# Patient Record
Sex: FEMALE | URBAN - METROPOLITAN AREA
[De-identification: names, ages, dates, MRNs, and addresses within clinical notes are randomized per-mention and may not be internally consistent; named-entity substitution may affect disease eponyms.]

---

## 2023-01-18 ENCOUNTER — APPOINTMENT (RX ONLY)
Dept: URBAN - METROPOLITAN AREA CLINIC 330 | Facility: CLINIC | Age: 43
Setting detail: DERMATOLOGY
End: 2023-01-18

## 2023-01-18 DIAGNOSIS — K13.0 DISEASES OF LIPS: ICD-10-CM | Status: INADEQUATELY CONTROLLED

## 2023-01-18 DIAGNOSIS — L81.4 OTHER MELANIN HYPERPIGMENTATION: ICD-10-CM | Status: INADEQUATELY CONTROLLED

## 2023-01-18 PROCEDURE — ? COUNSELING

## 2023-01-18 PROCEDURE — ? ADDITIONAL NOTES

## 2023-01-18 PROCEDURE — ? PRESCRIPTION MEDICATION MANAGEMENT

## 2023-01-18 PROCEDURE — 99203 OFFICE O/P NEW LOW 30 MIN: CPT

## 2023-01-18 PROCEDURE — ? PRESCRIPTION

## 2023-01-18 PROCEDURE — ? FULL BODY SKIN EXAM - DECLINED

## 2023-01-18 RX ORDER — NYSTATIN 100000 [USP'U]/G
OINTMENT TOPICAL
Qty: 30 | Refills: 1 | Status: ERX | COMMUNITY
Start: 2023-01-18

## 2023-01-18 RX ORDER — HYDROCORTISONE 25 MG/G
OINTMENT TOPICAL
Qty: 28.35 | Refills: 1 | Status: ERX | COMMUNITY
Start: 2023-01-18

## 2023-01-18 RX ADMIN — NYSTATIN: 100000 OINTMENT TOPICAL at 00:00

## 2023-01-18 RX ADMIN — HYDROCORTISONE: 25 OINTMENT TOPICAL at 00:00

## 2023-01-18 ASSESSMENT — LOCATION ZONE DERM: LOCATION ZONE: LIP

## 2023-01-18 ASSESSMENT — LOCATION DETAILED DESCRIPTION DERM
LOCATION DETAILED: LEFT SUPERIOR VERMILION LIP
LOCATION DETAILED: LEFT INFERIOR VERMILION LIP

## 2023-01-18 ASSESSMENT — LOCATION SIMPLE DESCRIPTION DERM: LOCATION SIMPLE: LEFT LIP

## 2023-01-18 NOTE — PROCEDURE: ADDITIONAL NOTES
Render Risk Assessment In Note?: no
Additional Notes: Discussed discoloration likely due to ongoing inflammation. Pt denies being on any medications that could cause pigment changes. Will observe for resolution with time. Pt to use good sun protection to prevent worsening.
Detail Level: Simple

## 2023-01-18 NOTE — HPI: RASH
How Severe Is Your Rash?: mild
Is This A New Presentation, Or A Follow-Up?: Rash
Additional History: Pt is here for a rash on her lips. She states they are discolored, peeling and flakey. She states she has tried otc products.

## 2023-01-18 NOTE — PROCEDURE: MIPS QUALITY
Detail Level: Detailed
Quality 130: Documentation Of Current Medications In The Medical Record: Current Medications Documented
Quality 110: Preventive Care And Screening: Influenza Immunization: Influenza Immunization previously received during influenza season
Quality 431: Preventive Care And Screening: Unhealthy Alcohol Use - Screening: Patient did not receive brief counseling if identified as an unhealthy alcohol user, reason not given
Quality 226: Preventive Care And Screening: Tobacco Use: Screening And Cessation Intervention: Tobacco Screening not Performed for Medical Reasons

## 2023-01-18 NOTE — PROCEDURE: PRESCRIPTION MEDICATION MANAGEMENT
Initiate Treatment: Nystatin and hydrocortisone mix today and apply to aa bid x2 weeks\\nVaseline prn
Detail Level: Simple
Render In Strict Bullet Format?: No

## 2023-03-03 ENCOUNTER — APPOINTMENT (RX ONLY)
Dept: URBAN - METROPOLITAN AREA CLINIC 330 | Facility: CLINIC | Age: 43
Setting detail: DERMATOLOGY
End: 2023-03-03

## 2023-03-03 DIAGNOSIS — L81.4 OTHER MELANIN HYPERPIGMENTATION: ICD-10-CM | Status: IMPROVED

## 2023-03-03 DIAGNOSIS — K13.0 DISEASES OF LIPS: ICD-10-CM | Status: IMPROVED

## 2023-03-03 PROBLEM — L30.9 DERMATITIS, UNSPECIFIED: Status: ACTIVE | Noted: 2023-03-03

## 2023-03-03 PROCEDURE — ? PRESCRIPTION MEDICATION MANAGEMENT

## 2023-03-03 PROCEDURE — 99213 OFFICE O/P EST LOW 20 MIN: CPT

## 2023-03-03 PROCEDURE — ? COUNSELING

## 2023-03-03 ASSESSMENT — LOCATION SIMPLE DESCRIPTION DERM: LOCATION SIMPLE: LEFT LIP

## 2023-03-03 ASSESSMENT — LOCATION ZONE DERM: LOCATION ZONE: LIP

## 2023-03-03 ASSESSMENT — LOCATION DETAILED DESCRIPTION DERM
LOCATION DETAILED: LEFT INFERIOR VERMILION LIP
LOCATION DETAILED: LEFT SUPERIOR VERMILION LIP

## 2023-03-03 NOTE — PROCEDURE: PRESCRIPTION MEDICATION MANAGEMENT
Continue Regimen: Pt advised she can use as needed:\\n\\nNystatin and hydrocortisone mix today and apply to aa bid x2 weeks\\nVaseline prn
Plan: Pt advised that this condition may come and go, it is safe to use the prescription medications as needed \\nPt advised she can re introduce cosmetics one at a time
Initiate Treatment: Dr. Luis Miguel taylor as needed
Detail Level: Simple
Render In Strict Bullet Format?: No
Samples Given: Dr. Dan’s Cortibalm

## 2023-08-02 ENCOUNTER — HOSPITAL ENCOUNTER (OUTPATIENT)
Dept: MAMMOGRAPHY | Age: 43
Discharge: HOME | End: 2023-08-05
Payer: COMMERCIAL

## 2023-08-02 VITALS — BODY MASS INDEX: 26.16 KG/M2 | HEIGHT: 65 IN | WEIGHT: 157 LBS

## 2023-08-02 DIAGNOSIS — Z12.31 BREAST CANCER SCREENING BY MAMMOGRAM: ICD-10-CM

## 2023-08-02 PROCEDURE — 77063 BREAST TOMOSYNTHESIS BI: CPT

## 2023-08-10 ENCOUNTER — HOSPITAL ENCOUNTER (OUTPATIENT)
Dept: MAMMOGRAPHY | Age: 43
End: 2023-08-10
Payer: COMMERCIAL

## 2023-08-10 ENCOUNTER — HOSPITAL ENCOUNTER (OUTPATIENT)
Dept: MAMMOGRAPHY | Age: 43
Discharge: HOME | End: 2023-08-10
Payer: COMMERCIAL

## 2023-08-10 DIAGNOSIS — R92.8 ABNORMAL SCREENING MAMMOGRAM: ICD-10-CM

## 2023-08-10 PROCEDURE — 76642 ULTRASOUND BREAST LIMITED: CPT

## 2023-08-10 PROCEDURE — G0279 TOMOSYNTHESIS, MAMMO: HCPCS

## 2024-01-02 ENCOUNTER — TRANSCRIBE ORDERS (OUTPATIENT)
Dept: SCHEDULING | Age: 44
End: 2024-01-02

## 2024-01-02 DIAGNOSIS — R92.8 ABNORMAL MAMMOGRAM: Primary | ICD-10-CM

## 2024-02-02 ENCOUNTER — HOSPITAL ENCOUNTER (OUTPATIENT)
Dept: MAMMOGRAPHY | Age: 44
Discharge: HOME | End: 2024-02-02
Attending: INTERNAL MEDICINE
Payer: COMMERCIAL

## 2024-02-02 DIAGNOSIS — R92.8 ABNORMAL MAMMOGRAM: ICD-10-CM

## 2024-02-02 PROCEDURE — 76642 ULTRASOUND BREAST LIMITED: CPT

## 2025-02-14 ENCOUNTER — TRANSCRIBE ORDERS (OUTPATIENT)
Dept: SCHEDULING | Age: 45
End: 2025-02-14

## 2025-02-14 DIAGNOSIS — Z12.31 VISIT FOR SCREENING MAMMOGRAM: Primary | ICD-10-CM

## 2025-03-21 ENCOUNTER — HOSPITAL ENCOUNTER (OUTPATIENT)
Dept: MAMMOGRAPHY | Age: 45
Discharge: HOME | End: 2025-03-24
Payer: COMMERCIAL

## 2025-03-21 DIAGNOSIS — Z12.31 VISIT FOR SCREENING MAMMOGRAM: ICD-10-CM

## 2025-03-21 PROCEDURE — 77063 BREAST TOMOSYNTHESIS BI: CPT

## 2025-04-11 ENCOUNTER — HOSPITAL ENCOUNTER (OUTPATIENT)
Dept: MAMMOGRAPHY | Age: 45
Discharge: HOME | End: 2025-04-14
Attending: INTERNAL MEDICINE
Payer: COMMERCIAL

## 2025-04-11 DIAGNOSIS — R92.8 ABNORMAL SCREENING MAMMOGRAM: ICD-10-CM

## 2025-04-11 PROCEDURE — G0279 TOMOSYNTHESIS, MAMMO: HCPCS

## 2025-04-11 PROCEDURE — 76642 ULTRASOUND BREAST LIMITED: CPT

## 2025-04-17 ENCOUNTER — HOSPITAL ENCOUNTER (OUTPATIENT)
Dept: MAMMOGRAPHY | Age: 45
Discharge: HOME | End: 2025-04-20
Payer: COMMERCIAL

## 2025-04-17 DIAGNOSIS — R93.89 ABNORMAL FINDING ON ULTRASOUND: ICD-10-CM

## 2025-04-17 PROCEDURE — 77066 DX MAMMO INCL CAD BI: CPT

## 2025-04-17 PROCEDURE — 88305 TISSUE EXAM BY PATHOLOGIST: CPT

## 2025-04-17 PROCEDURE — 19083 BX BREAST 1ST LESION US IMAG: CPT

## 2025-04-17 PROCEDURE — 6360000002 HC RX W HCPCS: Performed by: INTERNAL MEDICINE

## 2025-04-17 RX ORDER — LIDOCAINE HYDROCHLORIDE 10 MG/ML
15 INJECTION, SOLUTION INFILTRATION; PERINEURAL ONCE
Status: COMPLETED | OUTPATIENT
Start: 2025-04-17 | End: 2025-04-17

## 2025-04-17 RX ADMIN — LIDOCAINE HYDROCHLORIDE 15 ML: 10 INJECTION, SOLUTION INFILTRATION; PERINEURAL at 09:06

## 2025-04-21 ENCOUNTER — TELEPHONE (OUTPATIENT)
Dept: MAMMOGRAPHY | Age: 45
End: 2025-04-21

## 2025-04-21 NOTE — TELEPHONE ENCOUNTER
I spoke with the patient regarding the results of her recent breast biopsies. 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 She had no post biopsy issues or concerns and will return in March 2026 for her annual screening mammogram.